# Patient Record
Sex: MALE | URBAN - METROPOLITAN AREA
[De-identification: names, ages, dates, MRNs, and addresses within clinical notes are randomized per-mention and may not be internally consistent; named-entity substitution may affect disease eponyms.]

---

## 2018-04-17 ENCOUNTER — TELEPHONE (OUTPATIENT)
Dept: OTHER | Facility: CLINIC | Age: 33
End: 2018-04-17

## 2018-04-17 NOTE — TELEPHONE ENCOUNTER
4/17/2018    Call Regarding Onboarding [employer] Other     Attempt 1    Message on voicemail     Comments: 1 adult, 2 child       Outreach   CC

## 2018-05-16 NOTE — TELEPHONE ENCOUNTER
5/16/2018    Call Regarding Onboarding [employer] [place] [facility] OTHER    Attempt 2    Message on voicemail     Comments: 1 adult, 2 child       Outreach   TARYN

## 2018-06-11 NOTE — TELEPHONE ENCOUNTER
6/11/2018    Call Regarding Onboarding: [employer] [place] [facility] - OTHER    Attempt 3    Message on voicemail     Comments: spouse, 2 child dep      Outreach   SV